# Patient Record
Sex: MALE | Race: WHITE | NOT HISPANIC OR LATINO
[De-identification: names, ages, dates, MRNs, and addresses within clinical notes are randomized per-mention and may not be internally consistent; named-entity substitution may affect disease eponyms.]

---

## 2017-01-24 ENCOUNTER — APPOINTMENT (OUTPATIENT)
Dept: INTERNAL MEDICINE | Facility: CLINIC | Age: 82
End: 2017-01-24

## 2017-01-24 LAB — INR PPP: 3.1 RATIO

## 2017-02-16 ENCOUNTER — APPOINTMENT (OUTPATIENT)
Dept: DERMATOLOGY | Facility: CLINIC | Age: 82
End: 2017-02-16

## 2017-02-21 ENCOUNTER — RESULT REVIEW (OUTPATIENT)
Age: 82
End: 2017-02-21

## 2017-02-21 ENCOUNTER — APPOINTMENT (OUTPATIENT)
Dept: INTERNAL MEDICINE | Facility: CLINIC | Age: 82
End: 2017-02-21

## 2017-02-21 LAB — INR PPP: 3.4 RATIO

## 2017-03-21 ENCOUNTER — APPOINTMENT (OUTPATIENT)
Dept: INTERNAL MEDICINE | Facility: CLINIC | Age: 82
End: 2017-03-21

## 2017-03-21 LAB
INR PPP: 3.7 RATIO
QUALITY CONTROL: YES

## 2017-04-05 ENCOUNTER — APPOINTMENT (OUTPATIENT)
Dept: INTERNAL MEDICINE | Facility: CLINIC | Age: 82
End: 2017-04-05

## 2017-04-05 ENCOUNTER — RESULT REVIEW (OUTPATIENT)
Age: 82
End: 2017-04-05

## 2017-04-05 LAB — INR PPP: 2 RATIO

## 2017-04-24 ENCOUNTER — APPOINTMENT (OUTPATIENT)
Dept: INTERNAL MEDICINE | Facility: CLINIC | Age: 82
End: 2017-04-24

## 2017-05-22 ENCOUNTER — APPOINTMENT (OUTPATIENT)
Dept: INTERNAL MEDICINE | Facility: CLINIC | Age: 82
End: 2017-05-22

## 2017-05-22 DIAGNOSIS — N40.0 BENIGN PROSTATIC HYPERPLASIA WITHOUT LOWER URINARY TRACT SYMPMS: ICD-10-CM

## 2017-05-22 LAB — INR PPP: 1.4 RATIO

## 2017-06-26 ENCOUNTER — RESULT CHARGE (OUTPATIENT)
Age: 82
End: 2017-06-26

## 2017-06-26 ENCOUNTER — APPOINTMENT (OUTPATIENT)
Dept: INTERNAL MEDICINE | Facility: CLINIC | Age: 82
End: 2017-06-26

## 2017-06-26 DIAGNOSIS — R32 UNSPECIFIED URINARY INCONTINENCE: ICD-10-CM

## 2017-06-26 DIAGNOSIS — I48.91 UNSPECIFIED ATRIAL FIBRILLATION: ICD-10-CM

## 2017-06-26 DIAGNOSIS — F03.90 UNSPECIFIED DEMENTIA W/OUT BEHAVIORAL DISTURBANCE: ICD-10-CM

## 2017-06-26 LAB — INR PPP: 2.1 RATIO

## 2017-06-28 ENCOUNTER — INPATIENT (INPATIENT)
Facility: HOSPITAL | Age: 82
LOS: 4 days | Discharge: HOSPICE | DRG: 306 | End: 2017-07-03
Attending: HOSPITALIST | Admitting: HOSPITALIST
Payer: MEDICARE

## 2017-06-28 VITALS
WEIGHT: 173.06 LBS | OXYGEN SATURATION: 98 % | TEMPERATURE: 98 F | HEIGHT: 68 IN | SYSTOLIC BLOOD PRESSURE: 98 MMHG | RESPIRATION RATE: 16 BRPM | DIASTOLIC BLOOD PRESSURE: 63 MMHG | HEART RATE: 73 BPM

## 2017-06-28 DIAGNOSIS — R60.0 LOCALIZED EDEMA: ICD-10-CM

## 2017-06-28 DIAGNOSIS — R09.89 OTHER SPECIFIED SYMPTOMS AND SIGNS INVOLVING THE CIRCULATORY AND RESPIRATORY SYSTEMS: ICD-10-CM

## 2017-06-28 DIAGNOSIS — I10 ESSENTIAL (PRIMARY) HYPERTENSION: ICD-10-CM

## 2017-06-28 DIAGNOSIS — Z92.89 PERSONAL HISTORY OF OTHER MEDICAL TREATMENT: Chronic | ICD-10-CM

## 2017-06-28 DIAGNOSIS — F03.90 UNSPECIFIED DEMENTIA, UNSPECIFIED SEVERITY, WITHOUT BEHAVIORAL DISTURBANCE, PSYCHOTIC DISTURBANCE, MOOD DISTURBANCE, AND ANXIETY: ICD-10-CM

## 2017-06-28 DIAGNOSIS — I48.1 PERSISTENT ATRIAL FIBRILLATION: ICD-10-CM

## 2017-06-28 DIAGNOSIS — R53.1 WEAKNESS: ICD-10-CM

## 2017-06-28 LAB
ALBUMIN SERPL ELPH-MCNC: 4 G/DL — SIGNIFICANT CHANGE UP (ref 3.3–5.2)
ALP SERPL-CCNC: 111 U/L — SIGNIFICANT CHANGE UP (ref 40–120)
ALT FLD-CCNC: 14 U/L — SIGNIFICANT CHANGE UP
ANION GAP SERPL CALC-SCNC: 12 MMOL/L — SIGNIFICANT CHANGE UP (ref 5–17)
APTT BLD: 37.7 SEC — HIGH (ref 27.5–37.4)
AST SERPL-CCNC: 20 U/L — SIGNIFICANT CHANGE UP
BASOPHILS # BLD AUTO: 0 K/UL — SIGNIFICANT CHANGE UP (ref 0–0.2)
BASOPHILS NFR BLD AUTO: 0.1 % — SIGNIFICANT CHANGE UP (ref 0–2)
BILIRUB SERPL-MCNC: 0.6 MG/DL — SIGNIFICANT CHANGE UP (ref 0.4–2)
BUN SERPL-MCNC: 20 MG/DL — SIGNIFICANT CHANGE UP (ref 8–20)
CALCIUM SERPL-MCNC: 8.6 MG/DL — SIGNIFICANT CHANGE UP (ref 8.6–10.2)
CHLORIDE SERPL-SCNC: 102 MMOL/L — SIGNIFICANT CHANGE UP (ref 98–107)
CO2 SERPL-SCNC: 23 MMOL/L — SIGNIFICANT CHANGE UP (ref 22–29)
CREAT SERPL-MCNC: 0.85 MG/DL — SIGNIFICANT CHANGE UP (ref 0.5–1.3)
EOSINOPHIL # BLD AUTO: 0.3 K/UL — SIGNIFICANT CHANGE UP (ref 0–0.5)
EOSINOPHIL NFR BLD AUTO: 3.6 % — SIGNIFICANT CHANGE UP (ref 0–6)
GLUCOSE SERPL-MCNC: 92 MG/DL — SIGNIFICANT CHANGE UP (ref 70–115)
HCT VFR BLD CALC: 39 % — LOW (ref 42–52)
HGB BLD-MCNC: 12.9 G/DL — LOW (ref 14–18)
INR BLD: 2.54 RATIO — HIGH (ref 0.88–1.16)
LYMPHOCYTES # BLD AUTO: 1.2 K/UL — SIGNIFICANT CHANGE UP (ref 1–4.8)
LYMPHOCYTES # BLD AUTO: 15.5 % — LOW (ref 20–55)
MCHC RBC-ENTMCNC: 30.4 PG — SIGNIFICANT CHANGE UP (ref 27–31)
MCHC RBC-ENTMCNC: 33.1 G/DL — SIGNIFICANT CHANGE UP (ref 32–36)
MCV RBC AUTO: 91.8 FL — SIGNIFICANT CHANGE UP (ref 80–94)
MONOCYTES # BLD AUTO: 1 K/UL — HIGH (ref 0–0.8)
MONOCYTES NFR BLD AUTO: 14 % — HIGH (ref 3–10)
NEUTROPHILS # BLD AUTO: 5 K/UL — SIGNIFICANT CHANGE UP (ref 1.8–8)
NEUTROPHILS NFR BLD AUTO: 66.7 % — SIGNIFICANT CHANGE UP (ref 37–73)
PLATELET # BLD AUTO: 145 K/UL — LOW (ref 150–400)
POTASSIUM SERPL-MCNC: 4.4 MMOL/L — SIGNIFICANT CHANGE UP (ref 3.5–5.3)
POTASSIUM SERPL-SCNC: 4.4 MMOL/L — SIGNIFICANT CHANGE UP (ref 3.5–5.3)
PROT SERPL-MCNC: 7.1 G/DL — SIGNIFICANT CHANGE UP (ref 6.6–8.7)
PROTHROM AB SERPL-ACNC: 28.5 SEC — HIGH (ref 9.8–12.7)
RBC # BLD: 4.25 M/UL — LOW (ref 4.6–6.2)
RBC # FLD: 14.4 % — SIGNIFICANT CHANGE UP (ref 11–15.6)
SODIUM SERPL-SCNC: 137 MMOL/L — SIGNIFICANT CHANGE UP (ref 135–145)
TROPONIN T SERPL-MCNC: <0.01 NG/ML — SIGNIFICANT CHANGE UP (ref 0–0.06)
WBC # BLD: 7.5 K/UL — SIGNIFICANT CHANGE UP (ref 4.8–10.8)
WBC # FLD AUTO: 7.5 K/UL — SIGNIFICANT CHANGE UP (ref 4.8–10.8)

## 2017-06-28 PROCEDURE — 93010 ELECTROCARDIOGRAM REPORT: CPT

## 2017-06-28 PROCEDURE — 99223 1ST HOSP IP/OBS HIGH 75: CPT | Mod: AI

## 2017-06-28 PROCEDURE — 71010: CPT | Mod: 26

## 2017-06-28 PROCEDURE — 93970 EXTREMITY STUDY: CPT | Mod: 26

## 2017-06-28 PROCEDURE — 99284 EMERGENCY DEPT VISIT MOD MDM: CPT

## 2017-06-28 PROCEDURE — 70450 CT HEAD/BRAIN W/O DYE: CPT | Mod: 26

## 2017-06-28 RX ORDER — ASPIRIN/CALCIUM CARB/MAGNESIUM 324 MG
81 TABLET ORAL DAILY
Qty: 0 | Refills: 0 | Status: DISCONTINUED | OUTPATIENT
Start: 2017-06-28 | End: 2017-07-03

## 2017-06-28 RX ORDER — METOPROLOL TARTRATE 50 MG
75 TABLET ORAL
Qty: 0 | Refills: 0 | Status: DISCONTINUED | OUTPATIENT
Start: 2017-06-28 | End: 2017-07-03

## 2017-06-28 RX ORDER — TAMSULOSIN HYDROCHLORIDE 0.4 MG/1
0.4 CAPSULE ORAL AT BEDTIME
Qty: 0 | Refills: 0 | Status: DISCONTINUED | OUTPATIENT
Start: 2017-06-28 | End: 2017-07-03

## 2017-06-28 RX ADMIN — TAMSULOSIN HYDROCHLORIDE 0.4 MILLIGRAM(S): 0.4 CAPSULE ORAL at 23:34

## 2017-06-28 RX ADMIN — Medication 75 MILLIGRAM(S): at 17:34

## 2017-06-28 NOTE — H&P ADULT - ASSESSMENT
89 yo M with h/o persistent AFib (on coumadin), severe dementia, SSS s/p PPM here with generalized weakness and lower extremity swelling.

## 2017-06-28 NOTE — H&P ADULT - HISTORY OF PRESENT ILLNESS
89 yo M with h/o persistent AFib (on coumadin), severe dementia, SSS s/p PPM presents from home with worsening weakness over the last 10 days. Daughter at bedside states that pt has increasing bilateral leg swelling, and noticed that he seems to be more unsteady while ambulating. No recent infections, sick contacts, recent travels. No falling. They have also reported that his dementia has been progressively worsening over the past 5 years. His sleeping pattern is erratic, and stays awake throughout the night. He has become more nonverbal as of late. He has been incontinent with urine and feces.

## 2017-06-28 NOTE — ED PROVIDER NOTE - OBJECTIVE STATEMENT
Pt. present to ED with family to be evaluated for progressive weakness and unsteady gait for the past 10 days. Pt. does have hx of Dementia and A-fib and HTN. Family however states that there has been an increase in confusion. Pt. now staying up all night. Pt. also noted to be having bowel and bladder incontinence in the past 1-2 weeks. Pt. denies any chest pain or headache or vomiting or abdominal pain. Family also noted some increase in leg edema b/l. No fever. Wife gets 5 hours of Aid help 6 days a week. Family is concern about his fall risk especially while he is on the coumadin.

## 2017-06-28 NOTE — H&P ADULT - PROBLEM SELECTOR PLAN 1
Worsening weakness  Unclear etiology.   Iron studies pending.  B12, TSH pending  Physical therapy eval pending.

## 2017-06-28 NOTE — ED PROVIDER NOTE - MEDICAL DECISION MAKING DETAILS
Pt. with progressive weakness and unsteady gait and worsening dementia. Check labs/Head CT and re-evaluate.

## 2017-06-28 NOTE — ED ADULT NURSE NOTE - CHIEF COMPLAINT QUOTE
Pt accompanied by family who report pt with worsening dementia, incontinence and bilateral leg swelling. Denies any other complaints.

## 2017-06-28 NOTE — ED ADULT NURSE NOTE - OBJECTIVE STATEMENT
Pt brought in by family, family states pt with hx of dementia, worsening and pt becoming more incontinent. Pt is pleasantly confused. A & Ox1, pt afebrile. Pt's daughter states pt was incontinent however is even more incontinent and she states she feels like his dementia is worsening. Pt's wife also at bedside and states Dr Borja told her to bring him here for medical evaluation. Pt evaluated by MD waiting for test results.

## 2017-06-28 NOTE — ED ADULT NURSE REASSESSMENT NOTE - NS ED NURSE REASSESS COMMENT FT1
Admitting MD Camacho at bedside
Pt respirations even & unlabored. Pt's family remains at bedside. Pt continues to wait for room assignment, family aware of plan of care.

## 2017-06-28 NOTE — H&P ADULT - PROBLEM SELECTOR PLAN 3
INR 2.54 therapeutic  Appreciate cardiology consult. Given high risk of fall, will transition to aspirin  Rate controlled.

## 2017-06-28 NOTE — CONSULT NOTE ADULT - SUBJECTIVE AND OBJECTIVE BOX
[facility]                                           [facility]                                   [address] [place] [address]                                                      PHONE: [phone]                                                         FAX: [phone]  -------------------------------------------------------------------------------------------------------------------------------    89y Male with past medical history as under present to ED with family to be evaluated for progressive weakness and unsteady gait for the past 10 days. Pt does have hx of Dementia and A-fib and HTN. Family however states that there has been an increase in confusion and bowel and bladder incontinence in the past 1-2 weeks. No recent change in medications. He was also noted to have increasing edema. At the time of evaluation, pt reports no complains. Family at bedside report he has had difficulty with ambulation and feel he is at fall risk on coumadin.    PAST MEDICAL & SURGICAL HISTORY:  Atrial fibrillation  Colon cancer  HTN (hypertension)      No Known Allergies      Review of Systems:   Positive for weakness, fatigue, incontinence  Rest of the systems were reviewed and was negative.     Family history reviewed and non-contributory    Social History:  No smoking   No alcohol  No other drug use    Vital Signs Last 24 Hrs  T(C): 36.4 (28 Jun 2017 10:12), Max: 36.4 (28 Jun 2017 10:12)  T(F): 97.5 (28 Jun 2017 10:12), Max: 97.5 (28 Jun 2017 10:12)  HR: 73 (28 Jun 2017 10:12) (73 - 73)  BP: 98/63 (28 Jun 2017 10:12) (98/63 - 98/63)  BP(mean): --  RR: 16 (28 Jun 2017 10:12) (16 - 16)  SpO2: 98% (28 Jun 2017 10:12) (98% - 98%)    PHYSICAL EXAM:  Constitutional: Oriented to time, place and person. Appears weak  HEENT:     Conjunctiva normal, Normal oral mucosa, No JVD	  Cardiovascular:S1, S2 irregular  Respiratory: Lungs clear to auscultation; no crepitations, no wheeze  Gastrointestinal:  Soft, Non-tender, + BS	  Extremities: + edema [R>L]  Skin: Warm and dry  Neurologic: Alert oriented to time place and person  Psychiatric: affect was normal        LABS:                  RADIOLOGY & ADDITIONAL STUDIES:    CARDIOLOGY TESTING:     ECG: AF with intermittent pacing    Echocardiogram-2014: LVEF 50% with moderate MR, normal PAP    MEDICATIONS:  MEDICATIONS  (STANDING):    MEDICATIONS  (PRN):      ASSESSMENT AND PLAN:    89y Male with prior history of permanent AF on coumadin, SSS s/p PPM, dementia, moderate MR without HF who is brought in for weakness, fatigue, and lower extremity swelling [R>L].     -  Pt with significant decline in functional status and increased fall risk- Will d/c coumadin and change to ASA   -  AF rate controlled  -  Lower extremity Doppler to r/o DVT  -  No gross fluid overload/HF  -  Social work evaluation for NH

## 2017-06-28 NOTE — H&P ADULT - RS GEN PE MLT RESP DETAILS PC
no wheezes/breath sounds equal/no chest wall tenderness/airway patent/no rhonchi/respirations non-labored/good air movement/no intercostal retractions

## 2017-06-28 NOTE — ED ADULT TRIAGE NOTE - CHIEF COMPLAINT QUOTE
Pt accompanied by family who report pt with worsening dementia, incontinence and bilateral leg swelling. Pt accompanied by family who report pt with worsening dementia, incontinence and bilateral leg swelling. Denies any other complaints.

## 2017-06-29 DIAGNOSIS — J15.9 UNSPECIFIED BACTERIAL PNEUMONIA: ICD-10-CM

## 2017-06-29 LAB
ANION GAP SERPL CALC-SCNC: 15 MMOL/L — SIGNIFICANT CHANGE UP (ref 5–17)
BASOPHILS # BLD AUTO: 0 K/UL — SIGNIFICANT CHANGE UP (ref 0–0.2)
BASOPHILS NFR BLD AUTO: 0.1 % — SIGNIFICANT CHANGE UP (ref 0–2)
BUN SERPL-MCNC: 20 MG/DL — SIGNIFICANT CHANGE UP (ref 8–20)
CALCIUM SERPL-MCNC: 8.8 MG/DL — SIGNIFICANT CHANGE UP (ref 8.6–10.2)
CHLORIDE SERPL-SCNC: 104 MMOL/L — SIGNIFICANT CHANGE UP (ref 98–107)
CO2 SERPL-SCNC: 21 MMOL/L — LOW (ref 22–29)
CREAT SERPL-MCNC: 0.86 MG/DL — SIGNIFICANT CHANGE UP (ref 0.5–1.3)
EOSINOPHIL # BLD AUTO: 0.2 K/UL — SIGNIFICANT CHANGE UP (ref 0–0.5)
EOSINOPHIL NFR BLD AUTO: 2.3 % — SIGNIFICANT CHANGE UP (ref 0–5)
FERRITIN SERPL-MCNC: 143.9 NG/ML — SIGNIFICANT CHANGE UP (ref 30–400)
GLUCOSE SERPL-MCNC: 96 MG/DL — SIGNIFICANT CHANGE UP (ref 70–115)
HCT VFR BLD CALC: 37.8 % — LOW (ref 42–52)
HGB BLD-MCNC: 12.8 G/DL — LOW (ref 14–18)
IRON SATN MFR SERPL: 29 % — SIGNIFICANT CHANGE UP (ref 16–55)
IRON SATN MFR SERPL: 91 UG/DL — SIGNIFICANT CHANGE UP (ref 59–158)
LYMPHOCYTES # BLD AUTO: 1.2 K/UL — SIGNIFICANT CHANGE UP (ref 1–4.8)
LYMPHOCYTES # BLD AUTO: 15 % — LOW (ref 20–55)
MAGNESIUM SERPL-MCNC: 1.9 MG/DL — SIGNIFICANT CHANGE UP (ref 1.6–2.6)
MCHC RBC-ENTMCNC: 30.5 PG — SIGNIFICANT CHANGE UP (ref 27–31)
MCHC RBC-ENTMCNC: 33.9 G/DL — SIGNIFICANT CHANGE UP (ref 32–36)
MCV RBC AUTO: 90 FL — SIGNIFICANT CHANGE UP (ref 80–94)
MONOCYTES # BLD AUTO: 0.9 K/UL — HIGH (ref 0–0.8)
MONOCYTES NFR BLD AUTO: 11.5 % — HIGH (ref 3–10)
NEUTROPHILS # BLD AUTO: 5.6 K/UL — SIGNIFICANT CHANGE UP (ref 1.8–8)
NEUTROPHILS NFR BLD AUTO: 71 % — SIGNIFICANT CHANGE UP (ref 37–73)
PHOSPHATE SERPL-MCNC: 3 MG/DL — SIGNIFICANT CHANGE UP (ref 2.4–4.7)
PLATELET # BLD AUTO: 141 K/UL — LOW (ref 150–400)
POTASSIUM SERPL-MCNC: 4.2 MMOL/L — SIGNIFICANT CHANGE UP (ref 3.5–5.3)
POTASSIUM SERPL-SCNC: 4.2 MMOL/L — SIGNIFICANT CHANGE UP (ref 3.5–5.3)
RBC # BLD: 4.2 M/UL — LOW (ref 4.6–6.2)
RBC # FLD: 14.3 % — SIGNIFICANT CHANGE UP (ref 11–15.6)
SODIUM SERPL-SCNC: 140 MMOL/L — SIGNIFICANT CHANGE UP (ref 135–145)
T4 AB SER-ACNC: 5.8 UG/DL — SIGNIFICANT CHANGE UP (ref 4.5–12)
TIBC SERPL-MCNC: 317 UG/DL — SIGNIFICANT CHANGE UP (ref 220–430)
TRANSFERRIN SERPL-MCNC: 222 MG/DL — SIGNIFICANT CHANGE UP (ref 180–329)
TSH SERPL-MCNC: 8.67 UIU/ML — HIGH (ref 0.27–4.2)
VIT B12 SERPL-MCNC: 262 PG/ML — SIGNIFICANT CHANGE UP (ref 180–914)
WBC # BLD: 7.9 K/UL — SIGNIFICANT CHANGE UP (ref 4.8–10.8)
WBC # FLD AUTO: 7.9 K/UL — SIGNIFICANT CHANGE UP (ref 4.8–10.8)

## 2017-06-29 PROCEDURE — 99233 SBSQ HOSP IP/OBS HIGH 50: CPT

## 2017-06-29 RX ORDER — AZITHROMYCIN 500 MG/1
250 TABLET, FILM COATED ORAL DAILY
Qty: 0 | Refills: 0 | Status: DISCONTINUED | OUTPATIENT
Start: 2017-06-30 | End: 2017-06-30

## 2017-06-29 RX ORDER — AZITHROMYCIN 500 MG/1
500 TABLET, FILM COATED ORAL ONCE
Qty: 0 | Refills: 0 | Status: COMPLETED | OUTPATIENT
Start: 2017-06-29 | End: 2017-06-29

## 2017-06-29 RX ORDER — LEVOTHYROXINE SODIUM 125 MCG
25 TABLET ORAL DAILY
Qty: 0 | Refills: 0 | Status: DISCONTINUED | OUTPATIENT
Start: 2017-06-29 | End: 2017-07-03

## 2017-06-29 RX ADMIN — Medication 81 MILLIGRAM(S): at 12:13

## 2017-06-29 RX ADMIN — TAMSULOSIN HYDROCHLORIDE 0.4 MILLIGRAM(S): 0.4 CAPSULE ORAL at 22:00

## 2017-06-29 RX ADMIN — AZITHROMYCIN 500 MILLIGRAM(S): 500 TABLET, FILM COATED ORAL at 12:13

## 2017-06-29 RX ADMIN — Medication 25 MICROGRAM(S): at 12:14

## 2017-06-29 RX ADMIN — Medication 75 MILLIGRAM(S): at 16:42

## 2017-06-29 RX ADMIN — Medication 75 MILLIGRAM(S): at 05:28

## 2017-06-29 NOTE — PROGRESS NOTE ADULT - ASSESSMENT
89 yo M with h/o persistent AFib (on coumadin), severe dementia, SSS s/p PPM here with generalized weakness and lower extremity swelling.

## 2017-06-29 NOTE — PROGRESS NOTE ADULT - SUBJECTIVE AND OBJECTIVE BOX
SHARON CHENG    [id number]    89y      Male    INTERVAL HPI/OVERNIGHT EVENTS: Pt has hyperactive overnight and required to be redirected multiple times. Currently resting comfortably.    Hospital course:  89 yo M with h/o persistent AFib (on coumadin), severe dementia, SSS s/p PPM presents from home with worsening weakness over the last 10 days. Daughter at bedside states that pt has increasing bilateral leg swelling, and noticed that he seems to be more unsteady while ambulating. No recent infections, sick contacts, recent travels. No falling. They have also reported that his dementia has been progressively worsening over the past 5 years. His sleeping pattern is erratic, and stays awake throughout the night. He has become more nonverbal as of late. He has been incontinent with urine and feces. In the ED, pt was found to have b/l pitting edema. US negative for DVT. Head CT negative. TSH is 8.67, started on synthroid. Per cardiology, given high fall risk, coumadin stopped and switched to ASA.       REVIEW OF SYSTEMS:  Unable to obtain due to pt's severe dementia.       Vital Signs Last 24 Hrs  T(C): 36.4 (29 Jun 2017 08:11), Max: 37.1 (29 Jun 2017 02:42)  T(F): 97.5 (29 Jun 2017 08:11), Max: 98.8 (29 Jun 2017 02:42)  HR: 102 (29 Jun 2017 08:11) (73 - 110)  BP: 112/79 (29 Jun 2017 08:11) (98/63 - 120/80)  BP(mean): 74 (28 Jun 2017 14:02) (74 - 74)  RR: 20 (29 Jun 2017 08:11) (16 - 20)  SpO2: 100% (29 Jun 2017 02:42) (98% - 100%)    PHYSICAL EXAM:    GENERAL: NAD   HEENT: PERRL, +EOMI, MMM  CHEST/LUNG: Clear to percussion bilaterally; No wheezing  HEART: S1S2+, Regular rate and rhythm   ABDOMEN: Soft, Nontender, Nondistended; Bowel sounds     LABS:                        12.8   7.9   )-----------( 141      ( 29 Jun 2017 06:45 )             37.8     06-29    140  |  104  |  20.0  ----------------------------<  96  4.2   |  21.0<L>  |  0.86    Ca    8.8      29 Jun 2017 06:45  Phos  3.0     06-29  Mg     1.9     06-29    TPro  7.1  /  Alb  4.0  /  TBili  0.6  /  DBili  x   /  AST  20  /  ALT  14  /  AlkPhos  111  06-28    PT/INR - ( 28 Jun 2017 12:29 )   PT: 28.5 sec;   INR: 2.54 ratio         PTT - ( 28 Jun 2017 12:29 )  PTT:37.7 sec        MEDICATIONS  (STANDING):  tamsulosin 0.4 milliGRAM(s) Oral at bedtime  metoprolol 75 milliGRAM(s) Oral two times a day  aspirin enteric coated 81 milliGRAM(s) Oral daily  levothyroxine 25 MICROGram(s) Oral daily    MEDICATIONS  (PRN):      RADIOLOGY & ADDITIONAL TESTS:

## 2017-06-29 NOTE — PROGRESS NOTE ADULT - PROBLEM SELECTOR PLAN 1
Worsening weakness  Unclear etiology - possibly 2/2 pneumonia vs. worsening dementia  Iron studies normal  B12, TSH pending  Physical therapy eval pending.

## 2017-06-29 NOTE — PROGRESS NOTE ADULT - PROBLEM SELECTOR PLAN 4
Appreciate cardiology consult. Given high risk of fall, will transition to aspirin  Rate controlled.

## 2017-06-30 DIAGNOSIS — R06.00 DYSPNEA, UNSPECIFIED: ICD-10-CM

## 2017-06-30 LAB
ANION GAP SERPL CALC-SCNC: 16 MMOL/L — SIGNIFICANT CHANGE UP (ref 5–17)
BUN SERPL-MCNC: 18 MG/DL — SIGNIFICANT CHANGE UP (ref 8–20)
CALCIUM SERPL-MCNC: 8.9 MG/DL — SIGNIFICANT CHANGE UP (ref 8.6–10.2)
CHLORIDE SERPL-SCNC: 105 MMOL/L — SIGNIFICANT CHANGE UP (ref 98–107)
CO2 SERPL-SCNC: 18 MMOL/L — LOW (ref 22–29)
CREAT SERPL-MCNC: 0.79 MG/DL — SIGNIFICANT CHANGE UP (ref 0.5–1.3)
GLUCOSE SERPL-MCNC: 242 MG/DL — HIGH (ref 70–115)
HCT VFR BLD CALC: 41.1 % — LOW (ref 42–52)
HGB BLD-MCNC: 14.4 G/DL — SIGNIFICANT CHANGE UP (ref 14–18)
MAGNESIUM SERPL-MCNC: 2.1 MG/DL — SIGNIFICANT CHANGE UP (ref 1.6–2.6)
MCHC RBC-ENTMCNC: 30.9 PG — SIGNIFICANT CHANGE UP (ref 27–31)
MCHC RBC-ENTMCNC: 35 G/DL — SIGNIFICANT CHANGE UP (ref 32–36)
MCV RBC AUTO: 88.2 FL — SIGNIFICANT CHANGE UP (ref 80–94)
NT-PROBNP SERPL-SCNC: HIGH PG/ML (ref 0–300)
PHOSPHATE SERPL-MCNC: 2.7 MG/DL — SIGNIFICANT CHANGE UP (ref 2.4–4.7)
PLATELET # BLD AUTO: 155 K/UL — SIGNIFICANT CHANGE UP (ref 150–400)
POTASSIUM SERPL-MCNC: 3.9 MMOL/L — SIGNIFICANT CHANGE UP (ref 3.5–5.3)
POTASSIUM SERPL-SCNC: 3.9 MMOL/L — SIGNIFICANT CHANGE UP (ref 3.5–5.3)
RBC # BLD: 4.66 M/UL — SIGNIFICANT CHANGE UP (ref 4.6–6.2)
RBC # FLD: 14.5 % — SIGNIFICANT CHANGE UP (ref 11–15.6)
SODIUM SERPL-SCNC: 139 MMOL/L — SIGNIFICANT CHANGE UP (ref 135–145)
WBC # BLD: 15.4 K/UL — HIGH (ref 4.8–10.8)
WBC # FLD AUTO: 15.4 K/UL — HIGH (ref 4.8–10.8)

## 2017-06-30 PROCEDURE — 99233 SBSQ HOSP IP/OBS HIGH 50: CPT

## 2017-06-30 PROCEDURE — 71010: CPT | Mod: 26

## 2017-06-30 PROCEDURE — 93306 TTE W/DOPPLER COMPLETE: CPT | Mod: 26

## 2017-06-30 RX ORDER — FUROSEMIDE 40 MG
40 TABLET ORAL DAILY
Qty: 0 | Refills: 0 | Status: DISCONTINUED | OUTPATIENT
Start: 2017-06-30 | End: 2017-07-03

## 2017-06-30 RX ORDER — ALBUTEROL 90 UG/1
2.5 AEROSOL, METERED ORAL EVERY 6 HOURS
Qty: 0 | Refills: 0 | Status: COMPLETED | OUTPATIENT
Start: 2017-06-30 | End: 2017-07-02

## 2017-06-30 RX ADMIN — Medication 75 MILLIGRAM(S): at 17:22

## 2017-06-30 RX ADMIN — ALBUTEROL 2.5 MILLIGRAM(S): 90 AEROSOL, METERED ORAL at 20:45

## 2017-06-30 RX ADMIN — Medication 75 MILLIGRAM(S): at 05:25

## 2017-06-30 RX ADMIN — AZITHROMYCIN 250 MILLIGRAM(S): 500 TABLET, FILM COATED ORAL at 12:23

## 2017-06-30 RX ADMIN — ALBUTEROL 2.5 MILLIGRAM(S): 90 AEROSOL, METERED ORAL at 10:16

## 2017-06-30 RX ADMIN — Medication 81 MILLIGRAM(S): at 12:23

## 2017-06-30 RX ADMIN — TAMSULOSIN HYDROCHLORIDE 0.4 MILLIGRAM(S): 0.4 CAPSULE ORAL at 22:28

## 2017-06-30 RX ADMIN — Medication 25 MICROGRAM(S): at 05:25

## 2017-06-30 RX ADMIN — Medication 40 MILLIGRAM(S): at 15:09

## 2017-06-30 NOTE — SWALLOW BEDSIDE ASSESSMENT ADULT - ASR SWALLOW ASPIRATION MONITOR
change of breathing pattern/pneumonia/upper respiratory infection/position upright (89Y)/cough/gurgly voice/oral hygiene/fever/throat clearing

## 2017-06-30 NOTE — SWALLOW BEDSIDE ASSESSMENT ADULT - SLP GENERAL OBSERVATIONS
Pt received & seen seated upright in bed, +awake/mostly alert, +poor cognition with inconsistent command following

## 2017-06-30 NOTE — SWALLOW BEDSIDE ASSESSMENT ADULT - MODE OF PRESENTATION
fed by clinician/self fed/cup self fed/cup/fed by clinician cup/fed by clinician fed by clinician/spoon fed by clinician

## 2017-06-30 NOTE — SWALLOW BEDSIDE ASSESSMENT ADULT - SWALLOW EVAL: DIAGNOSIS
Mild to moderate dysphagia compounded by poor cognition. Pharyngeal dysphagia suspected for thin & nectar thick fluids, with +cough post intake

## 2017-06-30 NOTE — PROGRESS NOTE ADULT - SUBJECTIVE AND OBJECTIVE BOX
SHARON CHENG    [id number]    89y      Male    INTERVAL HPI/OVERNIGHT EVENTS: No events on. Pt eating breakfast. He appears to have more labored breathing. Responds "yes" when asked if he is short of breath.    Hospital course:  89 yo M with h/o persistent AFib (on coumadin), severe dementia, SSS s/p PPM presents from home with worsening weakness over the last 10 days. Daughter at bedside states that pt has increasing bilateral leg swelling, and noticed that he seems to be more unsteady while ambulating. No recent infections, sick contacts, recent travels. No falling. They have also reported that his dementia has been progressively worsening over the past 5 years. His sleeping pattern is erratic, and stays awake throughout the night. He has become more nonverbal as of late. He has been incontinent with urine and feces. In the ED, pt was found to have b/l pitting edema. US negative for DVT. Head CT negative. TSH is 8.67, started on synthroid. Per cardiology, given high fall risk, coumadin stopped and switched to ASA.     REVIEW OF SYSTEMS:  Unable to obtain due to severe dementia    Vital Signs Last 24 Hrs  T(C): 36.6 (30 Jun 2017 04:36), Max: 36.7 (29 Jun 2017 21:17)  T(F): 97.8 (30 Jun 2017 04:36), Max: 98.1 (29 Jun 2017 21:17)  HR: 110 (30 Jun 2017 04:36) (89 - 110)  BP: 126/84 (30 Jun 2017 04:36) (115/68 - 126/84)  BP(mean): --  RR: 14 (30 Jun 2017 04:36) (14 - 19)  SpO2: 92% RA    PHYSICAL EXAM:    GENERAL: NAD, frail, elderly, mildly labored breathing  HEENT: PERRL, +EOMI, MMM  NECK: soft, Supple, No JVD,   CHEST/LUNG: bibasilar fine rales  HEART: S1S2+, Regular rate and rhythm   ABDOMEN: Soft, Nontender, Nondistended; Bowel sounds present      LABS:                        12.8   7.9   )-----------( 141      ( 29 Jun 2017 06:45 )             37.8     06-29    140  |  104  |  20.0  ----------------------------<  96  4.2   |  21.0<L>  |  0.86    Ca    8.8      29 Jun 2017 06:45  Phos  3.0     06-29  Mg     1.9     06-29    TPro  7.1  /  Alb  4.0  /  TBili  0.6  /  DBili  x   /  AST  20  /  ALT  14  /  AlkPhos  111  06-28    PT/INR - ( 28 Jun 2017 12:29 )   PT: 28.5 sec;   INR: 2.54 ratio         PTT - ( 28 Jun 2017 12:29 )  PTT:37.7 sec        MEDICATIONS  (STANDING):  tamsulosin 0.4 milliGRAM(s) Oral at bedtime  metoprolol 75 milliGRAM(s) Oral two times a day  aspirin enteric coated 81 milliGRAM(s) Oral daily  levothyroxine 25 MICROGram(s) Oral daily  azithromycin   Tablet 250 milliGRAM(s) Oral daily    MEDICATIONS  (PRN):      RADIOLOGY & ADDITIONAL TESTS:

## 2017-06-30 NOTE — SWALLOW BEDSIDE ASSESSMENT ADULT - SWALLOW EVAL: RECOMMENDED FEEDING/EATING TECHNIQUES
position upright (90 degrees)/check mouth frequently for oral residue/pocketing/small sips/bites/alternate food with liquid/maintain upright posture during/after eating for 30 mins/oral hygiene/crush medication (when feasible)

## 2017-06-30 NOTE — PROGRESS NOTE ADULT - PROBLEM SELECTOR PLAN 1
Repeat chest xray appears similar to admitting chest xray on my read - will await final read  ABG pending  Check BNP  Start oxygen supplementation

## 2017-06-30 NOTE — SWALLOW BEDSIDE ASSESSMENT ADULT - ORAL PHASE
varied oral holding due to poor cognition/Delayed oral transit time Delayed oral transit time/varied oral holding due to poor cognition Delayed oral transit time/Decreased anterior-posterior movement of the bolus/reduced attention to the bolus Decreased anterior-posterior movement of the bolus/Delayed oral transit time/reduced attention to the bolus with resultant delayed oral transit

## 2017-06-30 NOTE — PROGRESS NOTE ADULT - PROBLEM SELECTOR PLAN 2
Worsening weakness  Unclear etiology - possibly 2/2 pneumonia vs. worsening dementia  Iron studies normal  B12, TSH pending  Physical therapy eval - RIMA

## 2017-06-30 NOTE — PROGRESS NOTE ADULT - SUBJECTIVE AND OBJECTIVE BOX
[facility] - [facility]                                   [address] [place] [address]                                                      PHONE: [phone]                                                         FAX: [phone]  [email or web address]  ---------------------------------------------------------------------------------------------------------------------------------    Overnight events/patient complaints:  Pt trying to stand and walk with assistance    No Known Allergies    MEDICATIONS  (STANDING):  tamsulosin 0.4 milliGRAM(s) Oral at bedtime  metoprolol 75 milliGRAM(s) Oral two times a day  aspirin enteric coated 81 milliGRAM(s) Oral daily  levothyroxine 25 MICROGram(s) Oral daily  azithromycin   Tablet 250 milliGRAM(s) Oral daily  ALBUTerol    0.083% 2.5 milliGRAM(s) Nebulizer every 6 hours    MEDICATIONS  (PRN):      Vital Signs Last 24 Hrs  T(C): 36.6 (30 Jun 2017 04:36), Max: 36.7 (29 Jun 2017 21:17)  T(F): 97.8 (30 Jun 2017 04:36), Max: 98.1 (29 Jun 2017 21:17)  HR: 110 (30 Jun 2017 04:36) (89 - 110)  BP: 126/84 (30 Jun 2017 04:36) (115/68 - 126/84)  BP(mean): --  RR: 14 (30 Jun 2017 04:36) (14 - 19)  SpO2: 94% (29 Jun 2017 21:17) (93% - 94%)  ICU Vital Signs Last 24 Hrs  SHARON CHENG  I&O's Detail    Drug Dosing Weight  SHARON CHENG      PHYSICAL EXAM:  General: Appears frail  HEENT: Head; normocephalic, atraumatic.  Eyes: Pupils reactive, cornea wnl.  Neck: Supple, no nodes adenopathy, no NVD or carotid bruit or thyromegaly.  CARDIOVASCULAR: Normal S1 and S2, No murmur, rub, gallop or lift.   LUNGS: No rales, rhonchi or wheeze. Normal breath sounds bilaterally.  ABDOMEN: Soft, nontender without mass or organomegaly. bowel sounds normoactive.  EXTREMITIES: trace edema. Distal pulses wnl.   SKIN: warm and dry with normal turgor.  NEURO: no FND  PSYCH: normal affect.        LABS:                        12.8   7.9   )-----------( 141      ( 29 Jun 2017 06:45 )             37.8     06-29    140  |  104  |  20.0  ----------------------------<  96  4.2   |  21.0<L>  |  0.86    Ca    8.8      29 Jun 2017 06:45  Phos  3.0     06-29  Mg     1.9     06-29    TPro  7.1  /  Alb  4.0  /  TBili  0.6  /  DBili  x   /  AST  20  /  ALT  14  /  AlkPhos  111  06-28    SHARON CHENG  CARDIAC MARKERS ( 28 Jun 2017 12:29 )  x     / <0.01 ng/mL / x     / x     / x          PT/INR - ( 28 Jun 2017 12:29 )   PT: 28.5 sec;   INR: 2.54 ratio         PTT - ( 28 Jun 2017 12:29 )  PTT:37.7 sec      RADIOLOGY & ADDITIONAL STUDIES:         ASSESSMENT AND PLAN:  In summary, SHARON CHENG is an 89y Male with past medical history significant for Permanent AF, PPM, Dementia, mod MR aw fatigue and LE edema.    1) CXR improved from prior  2) Not an AC candidate for fall risk, now on ASA 325mg daily  3) Please recall with questions or concerns

## 2017-07-01 DIAGNOSIS — I34.0 NONRHEUMATIC MITRAL (VALVE) INSUFFICIENCY: ICD-10-CM

## 2017-07-01 DIAGNOSIS — E53.8 DEFICIENCY OF OTHER SPECIFIED B GROUP VITAMINS: ICD-10-CM

## 2017-07-01 DIAGNOSIS — E03.9 HYPOTHYROIDISM, UNSPECIFIED: ICD-10-CM

## 2017-07-01 LAB
ANION GAP SERPL CALC-SCNC: 17 MMOL/L — SIGNIFICANT CHANGE UP (ref 5–17)
BUN SERPL-MCNC: 22 MG/DL — HIGH (ref 8–20)
CALCIUM SERPL-MCNC: 9.1 MG/DL — SIGNIFICANT CHANGE UP (ref 8.6–10.2)
CHLORIDE SERPL-SCNC: 105 MMOL/L — SIGNIFICANT CHANGE UP (ref 98–107)
CO2 SERPL-SCNC: 20 MMOL/L — LOW (ref 22–29)
CREAT SERPL-MCNC: 0.93 MG/DL — SIGNIFICANT CHANGE UP (ref 0.5–1.3)
GLUCOSE SERPL-MCNC: 220 MG/DL — HIGH (ref 70–115)
HCT VFR BLD CALC: 40.6 % — LOW (ref 42–52)
HGB BLD-MCNC: 14.1 G/DL — SIGNIFICANT CHANGE UP (ref 14–18)
MCHC RBC-ENTMCNC: 30.7 PG — SIGNIFICANT CHANGE UP (ref 27–31)
MCHC RBC-ENTMCNC: 34.7 G/DL — SIGNIFICANT CHANGE UP (ref 32–36)
MCV RBC AUTO: 88.3 FL — SIGNIFICANT CHANGE UP (ref 80–94)
PLATELET # BLD AUTO: 135 K/UL — LOW (ref 150–400)
POTASSIUM SERPL-MCNC: 3.8 MMOL/L — SIGNIFICANT CHANGE UP (ref 3.5–5.3)
POTASSIUM SERPL-SCNC: 3.8 MMOL/L — SIGNIFICANT CHANGE UP (ref 3.5–5.3)
RBC # BLD: 4.6 M/UL — SIGNIFICANT CHANGE UP (ref 4.6–6.2)
RBC # FLD: 14.6 % — SIGNIFICANT CHANGE UP (ref 11–15.6)
SODIUM SERPL-SCNC: 142 MMOL/L — SIGNIFICANT CHANGE UP (ref 135–145)
WBC # BLD: 13.8 K/UL — HIGH (ref 4.8–10.8)
WBC # FLD AUTO: 13.8 K/UL — HIGH (ref 4.8–10.8)

## 2017-07-01 PROCEDURE — 99233 SBSQ HOSP IP/OBS HIGH 50: CPT

## 2017-07-01 RX ORDER — PREGABALIN 225 MG/1
500 CAPSULE ORAL DAILY
Qty: 0 | Refills: 0 | Status: DISCONTINUED | OUTPATIENT
Start: 2017-07-01 | End: 2017-07-03

## 2017-07-01 RX ORDER — TRAZODONE HCL 50 MG
25 TABLET ORAL AT BEDTIME
Qty: 0 | Refills: 0 | Status: DISCONTINUED | OUTPATIENT
Start: 2017-07-01 | End: 2017-07-03

## 2017-07-01 RX ORDER — MORPHINE SULFATE 50 MG/1
2 CAPSULE, EXTENDED RELEASE ORAL EVERY 4 HOURS
Qty: 0 | Refills: 0 | Status: DISCONTINUED | OUTPATIENT
Start: 2017-07-01 | End: 2017-07-03

## 2017-07-01 RX ADMIN — MORPHINE SULFATE 2 MILLIGRAM(S): 50 CAPSULE, EXTENDED RELEASE ORAL at 20:42

## 2017-07-01 RX ADMIN — MORPHINE SULFATE 2 MILLIGRAM(S): 50 CAPSULE, EXTENDED RELEASE ORAL at 21:10

## 2017-07-01 RX ADMIN — MORPHINE SULFATE 2 MILLIGRAM(S): 50 CAPSULE, EXTENDED RELEASE ORAL at 15:00

## 2017-07-01 RX ADMIN — ALBUTEROL 2.5 MILLIGRAM(S): 90 AEROSOL, METERED ORAL at 08:31

## 2017-07-01 RX ADMIN — MORPHINE SULFATE 2 MILLIGRAM(S): 50 CAPSULE, EXTENDED RELEASE ORAL at 14:42

## 2017-07-01 RX ADMIN — Medication 25 MICROGRAM(S): at 05:22

## 2017-07-01 RX ADMIN — Medication 40 MILLIGRAM(S): at 05:22

## 2017-07-01 RX ADMIN — Medication 75 MILLIGRAM(S): at 05:22

## 2017-07-01 RX ADMIN — Medication 2 MILLIGRAM(S): at 11:40

## 2017-07-01 NOTE — PROGRESS NOTE ADULT - PROBLEM SELECTOR PLAN 3
Worsening weakness  Unclear etiology - possibly 2/2 pneumonia vs. worsening dementia  Iron studies normal  B12 - 262. Start b12.  TSH 8.67 - c/w synthroid  Physical therapy eval - RIMA

## 2017-07-01 NOTE — PROGRESS NOTE ADULT - PROBLEM SELECTOR PLAN 2
CXR showing patchy infiltrates  Worsening WBC and respiratory status - transitioned to levaquin day 2/7

## 2017-07-01 NOTE — PROGRESS NOTE ADULT - SUBJECTIVE AND OBJECTIVE BOX
SHARON CHENG    [id number]    89y      Male    INTERVAL HPI/OVERNIGHT EVENTS: No events on. Family at bedside. Pt said "yes" when asked if he is short of breath.     Hospital course:  89 yo M with h/o persistent AFib (on coumadin), severe dementia, SSS s/p PPM presents from home with worsening weakness over the last 10 days. Daughter at bedside states that pt has increasing bilateral leg swelling, and noticed that he seems to be more unsteady while ambulating. No recent infections, sick contacts, recent travels. No falling. They have also reported that his dementia has been progressively worsening over the past 5 years. His sleeping pattern is erratic, and stays awake throughout the night. He has become more nonverbal as of late. He has been incontinent with urine and feces. In the ED, pt was found to have b/l pitting edema. US negative for DVT. Head CT negative. TSH is 8.67, started on synthroid. Per cardiology, given high fall risk, coumadin stopped and switched to ASA. CXR showed mild pulmonary vascular congestion. BNP 35538. 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 Started on IV lasix.       REVIEW OF SYSTEMS:  Unable to obtain due to severe dementia.     Vital Signs Last 24 Hrs  T(C): 36.4 (01 Jul 2017 04:25), Max: 36.4 (01 Jul 2017 04:25)  T(F): 97.5 (01 Jul 2017 04:25), Max: 97.5 (01 Jul 2017 04:25)  HR: 100 (01 Jul 2017 04:25) (99 - 108)  BP: 128/80 (01 Jul 2017 04:25) (120/74 - 128/86)  BP(mean): --  RR: 14 (01 Jul 2017 04:25) (14 - 14)  SpO2: 93% (30 Jun 2017 20:45) (93% - 93%)    PHYSICAL EXAM:    GENERAL: NAD, more comfortable today  HEENT: PERRL, +EOMI, dry mm  CHEST/LUNG:  No wheezing  HEART: S1S2+, Regular rate and rhythm   ABDOMEN: Soft, Nontender, Nondistended; Bowel sounds present    LABS:                        14.1   13.8  )-----------( x        ( 01 Jul 2017 08:42 )             40.6     07-01    142  |  105  |  22.0<H>  ----------------------------<  220<H>  3.8   |  20.0<L>  |  0.93    Ca    9.1      01 Jul 2017 08:42  Phos  2.7     06-30  Mg     2.1     06-30              MEDICATIONS  (STANDING):  tamsulosin 0.4 milliGRAM(s) Oral at bedtime  metoprolol 75 milliGRAM(s) Oral two times a day  aspirin enteric coated 81 milliGRAM(s) Oral daily  levothyroxine 25 MICROGram(s) Oral daily  ALBUTerol    0.083% 2.5 milliGRAM(s) Nebulizer every 6 hours  furosemide   Injectable 40 milliGRAM(s) IV Push daily  traZODone 25 milliGRAM(s) Oral at bedtime  levoFLOXacin  Tablet 750 milliGRAM(s) Oral every 24 hours    MEDICATIONS  (PRN):      RADIOLOGY & ADDITIONAL TESTS:

## 2017-07-01 NOTE — PROGRESS NOTE ADULT - PROBLEM SELECTOR PLAN 1
BNP elevated 43949  Started gentle diuresis - lasix 40mg IVP  Improved respiratory status today  Awaiting further cardiology recommendations

## 2017-07-01 NOTE — PROGRESS NOTE ADULT - ATTENDING COMMENTS
Addendum: 10:23 am  Discussed with wife and daughter. Given pt's advanced dementia, they would like to forogo any aggressive intervention and are inquiring into hospice services. ADRIANNA obtained. Addendum: 10:23 am  Discussed with wife and daughter. Given pt's advanced dementia, they would like to forogo any aggressive intervention and are inquiring into hospice services. MOLST obtained. They do not want antibiotics. Patient comfort is most important.

## 2017-07-02 PROCEDURE — 99233 SBSQ HOSP IP/OBS HIGH 50: CPT

## 2017-07-02 RX ADMIN — MORPHINE SULFATE 2 MILLIGRAM(S): 50 CAPSULE, EXTENDED RELEASE ORAL at 08:16

## 2017-07-02 RX ADMIN — MORPHINE SULFATE 2 MILLIGRAM(S): 50 CAPSULE, EXTENDED RELEASE ORAL at 14:33

## 2017-07-02 RX ADMIN — Medication 2 MILLIGRAM(S): at 21:18

## 2017-07-02 RX ADMIN — PREGABALIN 500 MICROGRAM(S): 225 CAPSULE ORAL at 12:24

## 2017-07-02 RX ADMIN — Medication 25 MILLIGRAM(S): at 21:09

## 2017-07-02 RX ADMIN — MORPHINE SULFATE 2 MILLIGRAM(S): 50 CAPSULE, EXTENDED RELEASE ORAL at 19:00

## 2017-07-02 RX ADMIN — MORPHINE SULFATE 2 MILLIGRAM(S): 50 CAPSULE, EXTENDED RELEASE ORAL at 14:48

## 2017-07-02 RX ADMIN — MORPHINE SULFATE 2 MILLIGRAM(S): 50 CAPSULE, EXTENDED RELEASE ORAL at 08:30

## 2017-07-02 RX ADMIN — TAMSULOSIN HYDROCHLORIDE 0.4 MILLIGRAM(S): 0.4 CAPSULE ORAL at 21:09

## 2017-07-02 RX ADMIN — Medication 75 MILLIGRAM(S): at 17:46

## 2017-07-02 RX ADMIN — Medication 81 MILLIGRAM(S): at 12:24

## 2017-07-02 NOTE — PROGRESS NOTE ADULT - PROBLEM SELECTOR PLAN 1
BNP elevated 12865  C/w gentle diuresis - lasix 40mg IVP  Improved respiratory status today  Now for hospice

## 2017-07-02 NOTE — PROGRESS NOTE ADULT - SUBJECTIVE AND OBJECTIVE BOX
SHARON CHENG    [id number]    89y      Male    INTERVAL HPI/OVERNIGHT EVENTS: No events on. Son in law at bedside. Pt resting comfortably.    Hospital course:  89 yo M with h/o persistent AFib (on coumadin), severe dementia, SSS s/p PPM presents from home with worsening weakness over the last 10 days. Daughter at bedside states that pt has increasing bilateral leg swelling, and noticed that he seems to be more unsteady while ambulating. No recent infections, sick contacts, recent travels. No falling. They have also reported that his dementia has been progressively worsening over the past 5 years. His sleeping pattern is erratic, and stays awake throughout the night. He has become more nonverbal as of late. He has been incontinent with urine and feces. In the ED, pt was found to have b/l pitting edema. US negative for DVT. Head CT negative. TSH is 8.67, started on synthroid. Per cardiology, given high fall risk, coumadin stopped and switched to ASA. CXR showed mild pulmonary vascular congestion. BNP 45553. 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 Started on IV lasix. Per family discussions, hospice services to eval.       REVIEW OF SYSTEMS:  Unable to obtain due to severe dementia.    Vital Signs Last 24 Hrs  T(C): --  T(F): --  HR: --  BP: --  BP(mean): --  RR: --  SpO2: --    PHYSICAL EXAM:    GENERAL: NAD, frail, elderly  HEENT: PERRL, +EOMI, MMM  CHEST/LUNG: Clear to percussion bilaterally   HEART: S1S2+, Regular rate and rhythm   ABDOMEN: Soft, Nontender, Nondistended; Bowel sounds present      LABS:                        14.1   13.8  )-----------( 135      ( 01 Jul 2017 08:42 )             40.6     07-01    142  |  105  |  22.0<H>  ----------------------------<  220<H>  3.8   |  20.0<L>  |  0.93    Ca    9.1      01 Jul 2017 08:42  Phos  2.7     06-30  Mg     2.1     06-30              MEDICATIONS  (STANDING):  tamsulosin 0.4 milliGRAM(s) Oral at bedtime  metoprolol 75 milliGRAM(s) Oral two times a day  aspirin enteric coated 81 milliGRAM(s) Oral daily  levothyroxine 25 MICROGram(s) Oral daily  furosemide   Injectable 40 milliGRAM(s) IV Push daily  traZODone 25 milliGRAM(s) Oral at bedtime  cyanocobalamin 500 MICROGram(s) Oral daily    MEDICATIONS  (PRN):  morphine  - Injectable 2 milliGRAM(s) IV Push every 4 hours PRN dyspnea  LORazepam   Injectable 2 milliGRAM(s) IntraMuscular every 4 hours PRN Anxiety      RADIOLOGY & ADDITIONAL TESTS:

## 2017-07-03 VITALS
TEMPERATURE: 98 F | DIASTOLIC BLOOD PRESSURE: 96 MMHG | OXYGEN SATURATION: 94 % | RESPIRATION RATE: 20 BRPM | SYSTOLIC BLOOD PRESSURE: 138 MMHG | HEART RATE: 128 BPM

## 2017-07-03 DIAGNOSIS — R64 CACHEXIA: ICD-10-CM

## 2017-07-03 DIAGNOSIS — Z51.5 ENCOUNTER FOR PALLIATIVE CARE: ICD-10-CM

## 2017-07-03 DIAGNOSIS — G30.8 OTHER ALZHEIMER'S DISEASE: ICD-10-CM

## 2017-07-03 DIAGNOSIS — R45.1 RESTLESSNESS AND AGITATION: ICD-10-CM

## 2017-07-03 PROCEDURE — 80053 COMPREHEN METABOLIC PANEL: CPT

## 2017-07-03 PROCEDURE — 82607 VITAMIN B-12: CPT

## 2017-07-03 PROCEDURE — 94640 AIRWAY INHALATION TREATMENT: CPT

## 2017-07-03 PROCEDURE — 84436 ASSAY OF TOTAL THYROXINE: CPT

## 2017-07-03 PROCEDURE — 97163 PT EVAL HIGH COMPLEX 45 MIN: CPT

## 2017-07-03 PROCEDURE — 92610 EVALUATE SWALLOWING FUNCTION: CPT

## 2017-07-03 PROCEDURE — 83880 ASSAY OF NATRIURETIC PEPTIDE: CPT

## 2017-07-03 PROCEDURE — 99285 EMERGENCY DEPT VISIT HI MDM: CPT | Mod: 25

## 2017-07-03 PROCEDURE — 99239 HOSP IP/OBS DSCHRG MGMT >30: CPT

## 2017-07-03 PROCEDURE — 93306 TTE W/DOPPLER COMPLETE: CPT

## 2017-07-03 PROCEDURE — 36415 COLL VENOUS BLD VENIPUNCTURE: CPT

## 2017-07-03 PROCEDURE — 85730 THROMBOPLASTIN TIME PARTIAL: CPT

## 2017-07-03 PROCEDURE — 83735 ASSAY OF MAGNESIUM: CPT

## 2017-07-03 PROCEDURE — 85610 PROTHROMBIN TIME: CPT

## 2017-07-03 PROCEDURE — 84100 ASSAY OF PHOSPHORUS: CPT

## 2017-07-03 PROCEDURE — 93005 ELECTROCARDIOGRAM TRACING: CPT

## 2017-07-03 PROCEDURE — 84443 ASSAY THYROID STIM HORMONE: CPT

## 2017-07-03 PROCEDURE — 70450 CT HEAD/BRAIN W/O DYE: CPT

## 2017-07-03 PROCEDURE — 83550 IRON BINDING TEST: CPT

## 2017-07-03 PROCEDURE — 99222 1ST HOSP IP/OBS MODERATE 55: CPT

## 2017-07-03 PROCEDURE — 84466 ASSAY OF TRANSFERRIN: CPT

## 2017-07-03 PROCEDURE — 80048 BASIC METABOLIC PNL TOTAL CA: CPT

## 2017-07-03 PROCEDURE — 84484 ASSAY OF TROPONIN QUANT: CPT

## 2017-07-03 PROCEDURE — 93970 EXTREMITY STUDY: CPT

## 2017-07-03 PROCEDURE — 85027 COMPLETE CBC AUTOMATED: CPT

## 2017-07-03 PROCEDURE — 82728 ASSAY OF FERRITIN: CPT

## 2017-07-03 PROCEDURE — 71045 X-RAY EXAM CHEST 1 VIEW: CPT

## 2017-07-03 RX ORDER — LEVOTHYROXINE SODIUM 125 MCG
12.5 TABLET ORAL
Qty: 0 | Refills: 0 | COMMUNITY
Start: 2017-07-03

## 2017-07-03 RX ORDER — MORPHINE SULFATE 50 MG/1
2 CAPSULE, EXTENDED RELEASE ORAL
Qty: 0 | Refills: 0 | COMMUNITY
Start: 2017-07-03

## 2017-07-03 RX ORDER — HALOPERIDOL DECANOATE 100 MG/ML
2 INJECTION INTRAMUSCULAR ONCE
Qty: 0 | Refills: 0 | Status: COMPLETED | OUTPATIENT
Start: 2017-07-03 | End: 2017-07-03

## 2017-07-03 RX ORDER — HYDROMORPHONE HYDROCHLORIDE 2 MG/ML
2 INJECTION INTRAMUSCULAR; INTRAVENOUS; SUBCUTANEOUS ONCE
Qty: 0 | Refills: 0 | Status: DISCONTINUED | OUTPATIENT
Start: 2017-07-03 | End: 2017-07-03

## 2017-07-03 RX ORDER — TAMSULOSIN HYDROCHLORIDE 0.4 MG/1
1 CAPSULE ORAL
Qty: 0 | Refills: 0 | COMMUNITY

## 2017-07-03 RX ORDER — LEVOTHYROXINE SODIUM 125 MCG
12.5 TABLET ORAL DAILY
Qty: 0 | Refills: 0 | Status: DISCONTINUED | OUTPATIENT
Start: 2017-07-03 | End: 2017-07-03

## 2017-07-03 RX ORDER — METOPROLOL TARTRATE 50 MG
1 TABLET ORAL
Qty: 0 | Refills: 0 | COMMUNITY

## 2017-07-03 RX ORDER — WARFARIN SODIUM 2.5 MG/1
1 TABLET ORAL
Qty: 0 | Refills: 0 | COMMUNITY

## 2017-07-03 RX ORDER — FUROSEMIDE 40 MG
40 TABLET ORAL
Qty: 0 | Refills: 0 | COMMUNITY
Start: 2017-07-03

## 2017-07-03 RX ORDER — MORPHINE SULFATE 50 MG/1
2 CAPSULE, EXTENDED RELEASE ORAL EVERY 8 HOURS
Qty: 0 | Refills: 0 | Status: DISCONTINUED | OUTPATIENT
Start: 2017-07-03 | End: 2017-07-03

## 2017-07-03 RX ORDER — HYDROMORPHONE HYDROCHLORIDE 2 MG/ML
1 INJECTION INTRAMUSCULAR; INTRAVENOUS; SUBCUTANEOUS
Qty: 0 | Refills: 0 | COMMUNITY
Start: 2017-07-03

## 2017-07-03 RX ORDER — HYDROMORPHONE HYDROCHLORIDE 2 MG/ML
1 INJECTION INTRAMUSCULAR; INTRAVENOUS; SUBCUTANEOUS EVERY 4 HOURS
Qty: 0 | Refills: 0 | Status: DISCONTINUED | OUTPATIENT
Start: 2017-07-03 | End: 2017-07-03

## 2017-07-03 RX ADMIN — MORPHINE SULFATE 2 MILLIGRAM(S): 50 CAPSULE, EXTENDED RELEASE ORAL at 11:56

## 2017-07-03 RX ADMIN — Medication 75 MILLIGRAM(S): at 05:17

## 2017-07-03 RX ADMIN — Medication 1 MILLIGRAM(S): at 13:05

## 2017-07-03 RX ADMIN — Medication 25 MICROGRAM(S): at 05:17

## 2017-07-03 RX ADMIN — HYDROMORPHONE HYDROCHLORIDE 2 MILLIGRAM(S): 2 INJECTION INTRAMUSCULAR; INTRAVENOUS; SUBCUTANEOUS at 14:26

## 2017-07-03 RX ADMIN — HYDROMORPHONE HYDROCHLORIDE 2 MILLIGRAM(S): 2 INJECTION INTRAMUSCULAR; INTRAVENOUS; SUBCUTANEOUS at 13:33

## 2017-07-03 RX ADMIN — Medication 2 MILLIGRAM(S): at 07:47

## 2017-07-03 RX ADMIN — Medication 40 MILLIGRAM(S): at 05:17

## 2017-07-03 RX ADMIN — Medication 12.5 MICROGRAM(S): at 13:05

## 2017-07-03 RX ADMIN — Medication 2 MILLIGRAM(S): at 02:12

## 2017-07-03 RX ADMIN — HYDROMORPHONE HYDROCHLORIDE 1 MILLIGRAM(S): 2 INJECTION INTRAMUSCULAR; INTRAVENOUS; SUBCUTANEOUS at 14:55

## 2017-07-03 RX ADMIN — MORPHINE SULFATE 2 MILLIGRAM(S): 50 CAPSULE, EXTENDED RELEASE ORAL at 06:10

## 2017-07-03 RX ADMIN — MORPHINE SULFATE 2 MILLIGRAM(S): 50 CAPSULE, EXTENDED RELEASE ORAL at 10:42

## 2017-07-03 RX ADMIN — HYDROMORPHONE HYDROCHLORIDE 2 MILLIGRAM(S): 2 INJECTION INTRAMUSCULAR; INTRAVENOUS; SUBCUTANEOUS at 17:38

## 2017-07-03 RX ADMIN — MORPHINE SULFATE 2 MILLIGRAM(S): 50 CAPSULE, EXTENDED RELEASE ORAL at 05:18

## 2017-07-03 RX ADMIN — Medication 2 MILLIGRAM(S): at 14:55

## 2017-07-03 RX ADMIN — HYDROMORPHONE HYDROCHLORIDE 2 MILLIGRAM(S): 2 INJECTION INTRAMUSCULAR; INTRAVENOUS; SUBCUTANEOUS at 17:37

## 2017-07-03 RX ADMIN — HALOPERIDOL DECANOATE 2 MILLIGRAM(S): 100 INJECTION INTRAMUSCULAR at 17:37

## 2017-07-03 NOTE — CONSULT NOTE ADULT - PROBLEM SELECTOR RECOMMENDATION 3
-patient lethargic unable to swallow, already had pre-existing swallowing troubles. No IV fluids given acute MR, CHF.

## 2017-07-03 NOTE — GOALS OF CARE CONVERSATION - PERSONAL ADVANCE DIRECTIVE - CONVERSATION DETAILS
Hospice program and services explained shortterm nature of inpt hospice for symptom  management  discussed  daughter , spouse and family verbalized understanding .Pt has been approved for the hospice inn  [phone]  available bed .  at 4 pm  Daughter aware as is dr Hansa Main .and PMD Dr Aj Borja

## 2017-07-03 NOTE — PROGRESS NOTE ADULT - PROBLEM SELECTOR PLAN 6
Appreciate cardiology consult. Given high risk of fall, will transition to aspirin  Rate controlled.
Normotensive  C/w metoprolol
Supportive care  1:1 as pt very hyperactive  Likely will need placement.

## 2017-07-03 NOTE — DISCHARGE NOTE ADULT - PATIENT PORTAL LINK FT
“You can access the [facility] Patient Portal, offered by [facility], by registering with the following website: [email or web address]”

## 2017-07-03 NOTE — DISCHARGE NOTE ADULT - MEDICATION SUMMARY - MEDICATIONS TO STOP TAKING
I will STOP taking the medications listed below when I get home from the hospital:    metoprolol tartrate 75 mg oral tablet  -- 1 tab(s) by mouth 2 times a day    Flomax 0.4 mg oral capsule  -- 1 cap(s) by mouth once a day    warfarin 4 mg oral tablet  -- 1 tab(s) by mouth once a day

## 2017-07-03 NOTE — PROGRESS NOTE ADULT - PROBLEM SELECTOR PLAN 7
Normotensive  C/w metoprolol
Supportive care  Enhanced supervision  Likely will need placement.

## 2017-07-03 NOTE — DISCHARGE NOTE ADULT - HOSPITAL COURSE
89 yo M with h/o persistent AFib (on coumadin), severe dementia, SSS s/p PPM presents from home with worsening weakness over the last 10 days. Daughter at bedside states that pt has increasing bilateral leg swelling, and noticed that he seems to be more unsteady while ambulating. No recent infections, sick contacts, recent travels. No falling. They have also reported that his dementia has been progressively worsening over the past 5 years. His sleeping pattern is erratic, and stays awake throughout the night. He has become more nonverbal as of late. He has been incontinent with urine and feces. In the ED, pt was found to have b/l pitting edema. US negative for DVT. Head CT negative. TSH is 8.67, started on synthroid. Per cardiology, given high fall risk, coumadin stopped and switched to ASA. CXR showed mild pulmonary vascular congestion. BNP 89234. 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 Started on IV lasix. Per family discussions, hospice services.     Time to discharge: >35 minutes

## 2017-07-03 NOTE — PROGRESS NOTE ADULT - PROBLEM SELECTOR PLAN 3
Worsening weakness  Unclear etiology - possibly 2/2 pneumonia vs. worsening dementia  Iron studies normal  B12 - 262.   TSH 8.67 - c/w synthroid  Physical therapy eval - [facility]

## 2017-07-03 NOTE — DISCHARGE NOTE ADULT - CARE PLAN
Principal Discharge DX:	Non-rheumatic mitral regurgitation  Goal:	Comfort measures  Instructions for follow-up, activity and diet:	Comfort measures. Hospice.  Secondary Diagnosis:	Alzheimer's disease of other onset  Instructions for follow-up, activity and diet:	Comfort measures. Hospice.  Secondary Diagnosis:	Acquired hypothyroidism  Instructions for follow-up, activity and diet:	Comfort measures. Hospice.  Secondary Diagnosis:	B12 deficiency  Instructions for follow-up, activity and diet:	Comfort measures. Hospice.  Secondary Diagnosis:	Persistent atrial fibrillation  Instructions for follow-up, activity and diet:	Comfort measures. Hospice.  Secondary Diagnosis:	Essential hypertension  Instructions for follow-up, activity and diet:	Comfort measures. Hospice.  Secondary Diagnosis:	Pneumonia, bacterial  Instructions for follow-up, activity and diet:	Comfort measures. Hospice.

## 2017-07-03 NOTE — DISCHARGE NOTE ADULT - MEDICATION SUMMARY - MEDICATIONS TO TAKE
I will START or STAY ON the medications listed below when I get home from the hospital:    morphine  -- 2 milligram(s) intravenous every 4 hours, As Needed for dyspnea  -- Indication: For Alzheimer's disease of other onset    LORazepam  -- 2 milligram(s) intravenous every 4 hours, As Needed agitation  -- Indication: For Alzheimer's disease of other onset    furosemide 100 mg/100 mL-0.9% intravenous solution  -- 40 milliliter(s) intravenous once a day  -- Indication: For Dyspnea I will START or STAY ON the medications listed below when I get home from the hospital:    morphine  -- 2 milligram(s) intravenous every 4 hours, As Needed for dyspnea  -- Indication: For Alzheimer's disease of other onset    HYDROmorphone  -- 1 milligram(s) intravenous every 4 hours  -- Indication: For Comfort measures    LORazepam  -- 2 milligram(s) intravenous every 4 hours, As Needed   -- Indication: For Comfort measures    LORazepam  -- 2 milligram(s) intravenous every 2 hours, As Needed agitation or anxiety  -- Indication: For Comfort measures    furosemide 100 mg/100 mL-0.9% intravenous solution  -- 40 milliliter(s) intravenous once a day  -- Indication: For Dyspnea    levothyroxine  -- 12.5 milligram(s) intravenous once a day  -- Indication: For Comfort measures

## 2017-07-03 NOTE — PROGRESS NOTE ADULT - SUBJECTIVE AND OBJECTIVE BOX
SHARON CHENG    [id number]    89y      Male    INTERVAL HPI/OVERNIGHT EVENTS:    REVIEW OF SYSTEMS:    CONSTITUTIONAL: No fever, weight loss, or fatigue  RESPIRATORY: No cough, wheezing, hemoptysis; No shortness of breath  CARDIOVASCULAR: No chest pain, palpitations  GASTROINTESTINAL: No abdominal or epigastric pain. No nausea, vomiting  NEUROLOGICAL: No headaches, memory loss, loss of strength.  MISCELLANEOUS:      Vital Signs Last 24 Hrs  T(C): 36.8 (03 Jul 2017 05:14), Max: 36.8 (03 Jul 2017 05:14)  T(F): 98.2 (03 Jul 2017 05:14), Max: 98.2 (03 Jul 2017 05:14)  HR: 128 (03 Jul 2017 05:14) (128 - 128)  BP: 138/96 (03 Jul 2017 05:14) (138/96 - 138/96)  BP(mean): --  RR: 20 (03 Jul 2017 05:14) (20 - 20)  SpO2: 94% (03 Jul 2017 05:14) (94% - 94%)    PHYSICAL EXAM:    GENERAL: NAD, frail, apeniec breathing  HEENT: PERRL, +EOMI, MMM  CHEST/LUNG: bibasilar fine rales  HEART: S1S2+, Regular rate and rhythm   ABDOMEN: Soft, Nontender, Nondistended; Bowel sounds present    LABS:                  MEDICATIONS  (STANDING):  furosemide   Injectable 40 milliGRAM(s) IV Push daily  LORazepam   Injectable 0.5 milliGRAM(s) IV Push every 6 hours  levothyroxine Injectable 12.5 MICROGram(s) IV Push daily    MEDICATIONS  (PRN):  morphine  - Injectable 2 milliGRAM(s) IV Push every 4 hours PRN dyspnea      RADIOLOGY & ADDITIONAL TESTS: SHARON CHENG    [id number]    89y      Male    INTERVAL HPI/OVERNIGHT EVENTS: Family and palliative at bedside. Pt more lethargic today.    Hospital course:    89 yo M with h/o persistent AFib (on coumadin), severe dementia, SSS s/p PPM presents from home with worsening weakness over the last 10 days. Daughter at bedside states that pt has increasing bilateral leg swelling, and noticed that he seems to be more unsteady while ambulating. No recent infections, sick contacts, recent travels. No falling. They have also reported that his dementia has been progressively worsening over the past 5 years. His sleeping pattern is erratic, and stays awake throughout the night. He has become more nonverbal as of late. He has been incontinent with urine and feces. In the ED, pt was found to have b/l pitting edema. US negative for DVT. Head CT negative. TSH is 8.67, started on synthroid. Per cardiology, given high fall risk, coumadin stopped and switched to ASA. CXR showed mild pulmonary vascular congestion. BNP 78736. 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 Started on IV lasix. Per family discussions, hospice services to eval.     REVIEW OF SYSTEMS:  Unable to obtain due to lethargic state      Vital Signs Last 24 Hrs  T(C): 36.8 (03 Jul 2017 05:14), Max: 36.8 (03 Jul 2017 05:14)  T(F): 98.2 (03 Jul 2017 05:14), Max: 98.2 (03 Jul 2017 05:14)  HR: 128 (03 Jul 2017 05:14) (128 - 128)  BP: 138/96 (03 Jul 2017 05:14) (138/96 - 138/96)  BP(mean): --  RR: 20 (03 Jul 2017 05:14) (20 - 20)  SpO2: 94% (03 Jul 2017 05:14) (94% - 94%)    PHYSICAL EXAM:    GENERAL: NAD, frail, apeniec breathing  HEENT: PERRL, +EOMI, MMM  CHEST/LUNG: bibasilar fine rales  HEART: S1S2+, Regular rate and rhythm   ABDOMEN: Soft, Nontender, Nondistended; Bowel sounds present    LABS:                  MEDICATIONS  (STANDING):  furosemide   Injectable 40 milliGRAM(s) IV Push daily  LORazepam   Injectable 0.5 milliGRAM(s) IV Push every 6 hours  levothyroxine Injectable 12.5 MICROGram(s) IV Push daily    MEDICATIONS  (PRN):  morphine  - Injectable 2 milliGRAM(s) IV Push every 4 hours PRN dyspnea      RADIOLOGY & ADDITIONAL TESTS:

## 2017-07-03 NOTE — PROGRESS NOTE ADULT - PROBLEM SELECTOR PLAN 5
B12 - 262. Start b12 500mcg qd.
Appreciate cardiology consult. Given high risk of fall, will transition to aspirin  Rate controlled.
B12 - 262.  b12 500mcg qd.
B12 - 262. Start b12 500mcg qd.
Normotensive  C/w metoprolol

## 2017-07-03 NOTE — PROGRESS NOTE ADULT - PROBLEM SELECTOR PLAN 8
Supportive care  Enhanced supervision  Likely will need placement.

## 2017-07-03 NOTE — CONSULT NOTE ADULT - PROBLEM SELECTOR RECOMMENDATION 4
-lengthy discussion with 4 children at bedside. They understand grave prognosis, they just want to focus on his comfort and symptoms. They understand prognosis of hours to days at this point. hospice evaluation for potential inpatient setting if approved.

## 2017-07-03 NOTE — CONSULT NOTE ADULT - PROBLEM SELECTOR RECOMMENDATION 9
-end stage; wife and family has been caring for him for 10 years at home. he is completely dependent with all ADLs.

## 2017-07-03 NOTE — DISCHARGE NOTE ADULT - SECONDARY DIAGNOSIS.
Alzheimer's disease of other onset Acquired hypothyroidism B12 deficiency Persistent atrial fibrillation Essential hypertension Pneumonia, bacterial

## 2017-07-03 NOTE — CONSULT NOTE ADULT - SUBJECTIVE AND OBJECTIVE BOX
HPI: 89M with PMH as listed admitted 6/28 with increased leg swelling, acute CHF/moderate MR, now with periods of apnea, agitation, delirium.      PERTINENT PMH REVIEWED: Yes     PAST MEDICAL & SURGICAL HISTORY:  Atrial fibrillation  Colon cancer  HTN (hypertension)  History of bowel resection    SOCIAL HISTORY:  nonsmoker                                    Admitted from:  home    Surrogate - wife and daughter    FAMILY HISTORY:  No pertinent family history in first degree relatives    Baseline ADLs (prior to admission):  Dependent      Allergies    No Known Allergies    Present Symptoms:     Dyspnea: periods of apnea  Nausea/Vomiting: No  Anxiety:  Yes   Depression: No  Fatigue: Yes   Loss of appetite: unable    Pain: brow furrowing             Character-            Duration-            Effect-            Factors-            Frequency-            Location-            Severity-    Review of Systems: Reviewed                   Unable to obtain due to poor mentation - per family restless and agitated     MEDICATIONS  (STANDING):  furosemide   Injectable 40 milliGRAM(s) IV Push daily  LORazepam   Injectable 0.5 milliGRAM(s) IV Push every 6 hours  levothyroxine Injectable 12.5 MICROGram(s) IV Push daily    MEDICATIONS  (PRN):  morphine  - Injectable 2 milliGRAM(s) IV Push every 4 hours PRN dyspnea    PHYSICAL EXAM:    Vital Signs Last 24 Hrs  T(C): 36.8 (03 Jul 2017 05:14), Max: 36.8 (03 Jul 2017 05:14)  T(F): 98.2 (03 Jul 2017 05:14), Max: 98.2 (03 Jul 2017 05:14)  HR: 128 (03 Jul 2017 05:14) (128 - 128)  BP: 138/96 (03 Jul 2017 05:14) (138/96 - 138/96)  BP(mean): --  RR: 20 (03 Jul 2017 05:14) (20 - 20)  SpO2: 94% (03 Jul 2017 05:14) (94% - 94%)    General: lethargic; cachexia     Karnofsky:  20%    HEENT: normal      Lungs: comfortable     CV: normal      GI: normal      : incontinent      MSK: weakness  edema        Skin: no rash    LABS: none     I&O's Summary    RADIOLOGY & ADDITIONAL STUDIES: reviewed     ADVANCE DIRECTIVES: DNR/I

## 2017-07-03 NOTE — CONSULT NOTE ADULT - ATTENDING COMMENTS
Thank you for the opportunity to assist with the care of this patient.   [facility] Consult [facility] [phone].

## 2017-08-17 ENCOUNTER — APPOINTMENT (OUTPATIENT)
Dept: DERMATOLOGY | Facility: CLINIC | Age: 82
End: 2017-08-17

## 2020-07-20 NOTE — PROGRESS NOTE ADULT - I WAS PHYSICALLY PRESENT FOR THE KEY PORTIONS OF THE EVALUATION AND MANAGEMENT (E/M) SERVICE PROVIDED.  I AGREE WITH THE ABOVE HISTORY, PHYSICAL, AND PLAN WHICH I HAVE REVIEWED AND EDITED WHERE APPROPRIATE
Other (Free Text): Patient states he recently woke up one morning and noticed his ear was bleeding. He visited ENT and they could not determine cause. Treated today. Biopsy in 1 month if persists.
Other (Free Text): Second treatment of lesion on right shin. Biopsy in 1 month if persists. Patient defers treatment of hands for the summer because he spends a lot of time outside.
Detail Level: Detailed
Note Text (......Xxx Chief Complaint.): This diagnosis correlates with the
Statement Selected

## 2021-06-11 NOTE — ED PROVIDER NOTE - CADM POA URETHRAL CATHETER
Problem: Mobility Impaired (Adult and Pediatric)  Goal: *Acute Goals and Plan of Care (Insert Text)  Outcome: Progressing Towards Goal  Note: STG:  (1.)Mr. Michelle Kwon will move from supine to sit and sit to supine  with MIN A-CGA within 1-2 treatment day(s). (2.)Mr. Michelle Kwon will transfer from bed to chair and chair to bed with MINIMAL ASSIST-CGA using the least restrictive device within 1-2 treatment day(s). (3.)Mr. Michelle Kwon will ambulate with MINIMAL ASSIST-CGA for 3-5 feet with the least restrictive device within 1-2 treatment day(s). (Unless pt non-ambulatory)    LTG:  (1.)Mr. Michelle Kwon will move from supine to sit and sit to supine  in bed with CONTACT GUARD ASSIST-SBA within 3-10 treatment day(s). (2.)Mr. Michelle Kwon will transfer from bed to chair and chair to bed with CONTACT GUARD ASSIST-SBA using the least restrictive device within 3-10 treatment day(s). (3.)Mr. Michelle Kwon will ambulate with CONTACT GUARD ASSIST-SBA for 3-5 feet with the least restrictive device within 3-10 treatment day(s). ________________________________________________________________________________________________      PHYSICAL THERAPY: Daily Note and AM 6/11/2021  INPATIENT: PT Visit Days : 3  [place] / [place] / Product Type: Medicare /       NAME/AGE/GENDER: Pedro Rmoe is a 80 y.o. male   PRIMARY DIAGNOSIS: Severe sepsis ([facility] [A41.9, R65.20]  Acute cystitis [N30.00] Severe sepsis ([address] Severe sepsis ([address]       ICD-10: Treatment Diagnosis:    · Difficulty in walking, Not elsewhere classified (R26.2)  · Other abnormalities of gait and mobility (R26.89)   Precaution/Allergies:  Patient has no known allergies. ASSESSMENT:     Mr. Michelle Kwon presents with decreased independence with functional mobility. Per chart, pt is a resident of a local nursing home with recent agitation and decreased mentation. Per chart, son was with pt and provided history. This am, pt is on increased O2.  He is willing to participate. Mod assist for bed mobility, transfers and only able to amb 6' with RW due to limitation with AirVo tubing. Stays up in recliner and  Finishes his breakfast.  This section established at most recent assessment   PROBLEM LIST (Impairments causing functional limitations):  1. Decreased Strength  2. Decreased Transfer Abilities  3. Decreased Ambulation Ability/Technique  4. Decreased Balance  5. Increased Pain  6. Decreased Activity Tolerance  7. Decreased Flexibility/Joint Mobility   INTERVENTIONS PLANNED: (Benefits and precautions of physical therapy have been discussed with the patient.)  1. Bed Mobility  2. Gait Training  3. Therapeutic Activites  4. Therapeutic Exercise/Strengthening  5. Transfer Training     TREATMENT PLAN: Frequency/Duration: daily for duration of hospital stay  Rehabilitation Potential For Stated Goals: Good     REHAB RECOMMENDATIONS (at time of discharge pending progress):    Placement: It is my opinion, based on this patient's performance to date, that Mr. Carlos Jones may benefit from [address] after discharge due to the functional deficits listed above that are likely to improve with skilled rehabilitation because he/she has multiple medical issues that affect his/her functional mobility in the community. Equipment:    None at this time              HISTORY:   History of Present Injury/Illness (Reason for Referral):  Pt admitted with above diagnosis.   Past Medical History/Comorbidities:   Mr. Carlos Jones  has a past medical history of Abdominal aortic aneurysm ([address] (12/10/2015), Abdominal aortic aneurysm without rupture ([address] Abnormal finding of lung (10/17/2016), Abnormality of urethral meatus (8/8/2019), Allergic rhinitis (12/10/2015), Asthma, Benign neoplasm, Benign prostatic hyperplasia (12/10/2015), Bigeminy (3/28/2016), BPH without urinary obstruction, Cardiovascular disease (12/10/2015), Chronic obstructive asthma with exacerbation ([address] (12/10/2015), Closed compression fracture of lumbosacral spine ([facility] [address] [address] (12/11/2018), COPD (chronic obstructive pulmonary disease) ([address] (12/10/2015), COPD (chronic obstructive pulmonary disease) with emphysema ([address] (10/17/2016), COPD exacerbation ([address] (5/6/2019), Cough with hemoptysis, Erectile dysfunction (12/10/2015), Essential hypertension, benign (12/10/2015), Fracture (10/2014), History of colon polyps, Low testosterone (12/10/2015), Lumbago, Memory loss, Overflow incontinence, Pleural effusion (6/10/2019), Pneumothorax on right (5/6/2019), Raynaud's syndrome (12/10/2015), Rotator cuff tendinitis, Thrombocytopenia ([address] Urinary frequency, Ventricular tachyarrhythmia ([address] (5/6/2019), and VT (ventricular tachycardia) ([address] (5/6/2019). Mr. Giselle Nagy  has a past surgical history that includes hx hernia repair (1980); hx colonoscopy; hx fracture tx (10/2014); hx cataract removal (6/2016-right); and hx orthopaedic (03/2018). Social History/Living Environment:   Home Environment: Private residence  [address] [place] [address] [place] Name: [facility]  One/Two Story Residence: One story  Living Alone: No  Support Systems: Skilled nursing facility  Patient Expects to be Discharged to[de-identified] [address] [place] [address]  Current DME Used/Available at Home: Other (comment)  Tub or Shower Type: Shower  Prior Level of Function/Work/Activity:  Pt states he did not walk prior to admission. Number of Personal Factors/Comorbidities that affect the Plan of Care: 0: LOW COMPLEXITY   EXAMINATION:   Most Recent Physical Functioning:   Gross Assessment:                  Posture:     Balance:  Sitting: High guard  Standing: Pull to stand; With support Bed Mobility:  Supine to Sit: Moderate assistance  Scooting: Moderate assistance  Wheelchair Mobility:     Transfers:  Sit to Stand: Moderate assistance  Stand to Sit: Minimum assistance  Stand Pivot Transfers:  Moderate assistance  Bed to Chair: Moderate assistance  Duration: 30 Minutes  Gait:     Base of Support: Narrowed  Speed/Marifer: Slow  Gait Abnormalities: Scissoring  Distance (ft): 6 Feet (ft)  Assistive Device: Walker, rolling  Ambulation - Level of Assistance: Moderate assistance  Interventions: Safety awareness training; Tactile cues; Verbal cues; Visual/Demos      Body Structures Involved:  1. Lungs  2. Bones  3. Joints  4. Muscles  5. Ligaments Body Functions Affected:  1. Respiratory  2. Neuromusculoskeletal  3. Movement Related Activities and Participation Affected:  1. General Tasks and Demands  2. Mobility  3. Self Care   Number of elements that affect the Plan of Care: 4+: HIGH COMPLEXITY   CLINICAL PRESENTATION:   Presentation: Stable and uncomplicated: LOW COMPLEXITY   CLINICAL DECISION MAKING:   [facility] AM-PAC 6 Clicks   Basic Mobility Inpatient Short Form  How much difficulty does the patient currently have. .. Unable A Lot A Little None   1. Turning over in bed (including adjusting bedclothes, sheets and blankets)? [] 1   [] 2   [x] 3   [] 4   2. Sitting down on and standing up from a chair with arms ( e.g., wheelchair, bedside commode, etc.)   [] 1   [] 2   [x] 3   [] 4   3. Moving from lying on back to sitting on the side of the bed? [] 1   [] 2   [x] 3   [] 4   How much help from another person does the patient currently need. .. Total A Lot A Little None   4. Moving to and from a bed to a chair (including a wheelchair)? [] 1   [] 2   [x] 3   [] 4   5. Need to walk in hospital room? [] 1   [x] 2   [] 3   [] 4   6. Climbing 3-5 steps with a railing? [] 1   [x] 2   [] 3   [] 4   © 2007, Trustees of [facility], under license to [employer]. All rights reserved      Score:  Initial: 16 Most Recent: X (Date: -- )    Interpretation of Tool:  Represents activities that are increasingly more difficult (i.e. Bed mobility, Transfers, Gait). Medical Necessity:     · Skilled intervention continues to be required due to decreased independence with functional mobility.   Reason for Services/Other Comments:  · Patient continues to require skilled intervention due to   · Decreased independence with functional mobility. · .   Use of outcome tool(s) and clinical judgement create a POC that gives a: Clear prediction of patient's progress: LOW COMPLEXITY            TREATMENT:   (In addition to Assessment/Re-Assessment sessions the following treatments were rendered)   Pre-treatment Symptoms/Complaints:    Pain: Initial:   Pain Intensity 1: 0  Post Session: 0 in recliner with needs in reach. RN aware     Therapeutic Activity: (  30 Minutes ):  Therapeutic activities including Bed transfers, Toilet transfers, Ambulation on level ground  to improve mobility, strength and balance. Required minimal to moderate Safety awareness training; Tactile cues; Verbal cues; Visual/Demos              Date:  6/10/21 Date:  6/11/21 Date:     Activity/Exercise Parameters Parameters Parameters   Ankle pumps 10 10    Long arc quads 10 10    Seated marching 10 10    Sit to stand 4     SUPINE: Hip AB/AD  10AA         Heel slides  10AA                  Braces/Orthotics/Lines/Etc:   · li catheter  · telemetry/monitors  · O2 Device: Hi flow nasal cannula  Treatment/Session Assessment:    · Response to Treatment: did well. · Interdisciplinary Collaboration:   o Physical Therapist  o Registered Nurse  · After treatment position/precautions:   o Up in chair  o Bed/Chair-wheels locked  o Call light within reach  o RN notified   · Compliance with Program/Exercises: Compliant most of the time, Will assess as treatment progresses  · Recommendations/Intent for next treatment session: \"Next visit will focus on advancements to more challenging activities and reduction in assistance provided\".   Total Treatment Duration:  PT Patient Time In/Time Out  Time In: 1045  Time Out: [address], PT No

## 2025-06-03 NOTE — ED ADULT NURSE NOTE - CADM POA PRESS ULCER
Discharge Instructions for Biopsy of  the  Kidney  Home Care     Remove the bandage after a day or two.    Diet     Resume your normal diet.    Physical Activity     Rest for the first 24-48 hours.      Avoid strenuous activities for one week.      Avoid lifting anything 10 pounds (4.5 kilograms) or heavier for the 3-4 days after your procedure.     Medications    If you had to stop medicines before the procedure, ask your doctor when you can start again. Medicines often stopped include:    Anti-inflammatory drugs (eg, aspirin )     Blood thinners like clopidogrel (Plavix) or warfarin (Coumadin)   Follow-up    Schedule a follow-up appointment as directed by your doctor.      Call Your Doctor If Any of the Following Occurs   Monitor your recovery once you leave the hospital. As soon as you have a problem, alert your doctor. If any of the following occur, call your doctor:    Signs of infection, including fever and chills     Redness, swelling, increasing pain, excessive bleeding, or any discharge from the incision site     Pain that you can't control with the medications you've been given     Cough, shortness of breath, or chest pain     Pain when taking a deep breath     You feel your heart rate is fast   If any blood in your urine  In case of an emergency, call 911 immediately.       [facility] AMBULATORY PROCEDURE DISCHARGE INSTRUCTIONS  You may be drowsy or lightheaded after receiving sedation or anesthesia. Do not operate any vehicles (automobiles, bicycles, motorcycles) or power tools or machinery for 24 hours.  Do not sign any legal documents or make any legal decisions for 24 hours. Do not drink alcohol for 24 hours or while taking narcotic pain medication. A responsible person should be with you for the next 24 hours.      Please follow the instructions checked below:  DIET INSTRUCTIONS:  Resume normal diet    ACTIVITY INSTRUCTIONS:  [x]Rest today. Increase activity as tolerated   [x]No heavy lifting or 
No